# Patient Record
Sex: FEMALE | URBAN - METROPOLITAN AREA
[De-identification: names, ages, dates, MRNs, and addresses within clinical notes are randomized per-mention and may not be internally consistent; named-entity substitution may affect disease eponyms.]

---

## 2019-12-05 ENCOUNTER — APPOINTMENT (OUTPATIENT)
Dept: URBAN - METROPOLITAN AREA CLINIC 237 | Age: 49
Setting detail: DERMATOLOGY
End: 2019-12-05

## 2019-12-05 DIAGNOSIS — L81.4 OTHER MELANIN HYPERPIGMENTATION: ICD-10-CM

## 2019-12-05 PROCEDURE — OTHER TREATMENT REGIMEN: OTHER

## 2019-12-05 PROCEDURE — OTHER PATIENT SPECIFIC COUNSELING: OTHER

## 2019-12-05 PROCEDURE — OTHER COUNSELING: OTHER

## 2019-12-05 PROCEDURE — OTHER SUNSCREEN RECOMMENDATIONS: OTHER

## 2019-12-05 PROCEDURE — 99202 OFFICE O/P NEW SF 15 MIN: CPT

## 2019-12-05 ASSESSMENT — LOCATION SIMPLE DESCRIPTION DERM
LOCATION SIMPLE: LEFT CHEEK
LOCATION SIMPLE: RIGHT CHEEK

## 2019-12-05 ASSESSMENT — LOCATION DETAILED DESCRIPTION DERM
LOCATION DETAILED: RIGHT CENTRAL MALAR CHEEK
LOCATION DETAILED: LEFT CENTRAL MALAR CHEEK

## 2019-12-05 ASSESSMENT — LOCATION ZONE DERM: LOCATION ZONE: FACE

## 2019-12-05 NOTE — PROCEDURE: PATIENT SPECIFIC COUNSELING
Detail Level: Zone
Discussed methods of tx. Recommends beginning w/ glycolic acid products. Would like to avoid bleaching agents like HQ since this can cause bleaching of her natural skin. Unlikely glycolic acid will fade spots entirely. Other options include laser or chemical peels. Would likely refer out for cosmetic treatment depending on response.

## 2019-12-05 NOTE — PROCEDURE: TREATMENT REGIMEN
Detail Level: Zone
Initiate Treatment: SS qd\\nGlycolic acid: Glycolic glow (purchased today) or Glytone Brightening serum qhs
Samples Given: EltaMD spf 46 for hyperpigmentation
Plan: If no improvement, consider compounded Kojic acid/HQ from [facility]

## 2020-03-05 ENCOUNTER — APPOINTMENT (OUTPATIENT)
Dept: URBAN - METROPOLITAN AREA CLINIC 237 | Age: 50
Setting detail: DERMATOLOGY
End: 2020-03-05

## 2020-03-05 DIAGNOSIS — L81.4 OTHER MELANIN HYPERPIGMENTATION: ICD-10-CM

## 2020-03-05 PROCEDURE — OTHER PHOTO-DOCUMENTATION: OTHER

## 2020-03-05 PROCEDURE — OTHER PATIENT SPECIFIC COUNSELING: OTHER

## 2020-03-05 PROCEDURE — 99213 OFFICE O/P EST LOW 20 MIN: CPT

## 2020-03-05 PROCEDURE — OTHER TREATMENT REGIMEN: OTHER

## 2020-03-05 PROCEDURE — OTHER MIPS QUALITY: OTHER

## 2020-03-05 PROCEDURE — OTHER MEDICATION COUNSELING: OTHER

## 2020-03-05 ASSESSMENT — LOCATION SIMPLE DESCRIPTION DERM
LOCATION SIMPLE: RIGHT CHEEK
LOCATION SIMPLE: LEFT CHEEK

## 2020-03-05 ASSESSMENT — LOCATION DETAILED DESCRIPTION DERM
LOCATION DETAILED: LEFT CENTRAL MALAR CHEEK
LOCATION DETAILED: RIGHT CENTRAL MALAR CHEEK

## 2020-03-05 ASSESSMENT — LOCATION ZONE DERM: LOCATION ZONE: FACE

## 2020-03-05 NOTE — PROCEDURE: PATIENT SPECIFIC COUNSELING
Detail Level: Zone
Recommends escalating therapy to [facility] compound. Continue to use sunscreen daily. If pt isn’t getting the results she would like with topicals, consider IPL or chemical peels, although cautious use given skin type. Will give pt the names of a few different dermatologists to look into (Dr. Bae, Dr. Martin).\\n\\nAfter visit note: may need to up-titrate dose of HQ - some reports up to 8 or 12% with good effect.

## 2020-03-05 NOTE — PROCEDURE: MEDICATION COUNSELING
Xelwaiz Pregnancy And Lactation Text: This medication is Pregnancy Category D and is not considered safe during pregnancy.  The risk during breast feeding is also uncertain.

## 2020-03-05 NOTE — PROCEDURE: TREATMENT REGIMEN
Initiate Treatment: [facility] 21D: Kojic acid 5%, Desonide 0.05%, HQ 2% apply qhs
Continue Regimen: EltaMD sunscreen qam
Discontinue Regimen: Glycolic acid
Detail Level: Zone

## 2020-03-05 NOTE — PROCEDURE: PHOTO-DOCUMENTATION
Photo Preface (Leave Blank If You Do Not Want): Photographs were obtained today
Details (Free Text): 2
Detail Level: Zone

## 2020-03-05 NOTE — PROCEDURE: MEDICATION COUNSELING
No Rhofade Counseling: Rhofade is a topical medication which can decrease superficial blood flow where applied. Side effects are uncommon and include stinging, redness and allergic reactions.

## 2023-09-28 NOTE — PROCEDURE: MEDICATION COUNSELING
Addended by: SWATI DENSON on: 9/28/2023 08:40 AM     Modules accepted: Orders     Azithromycin Counseling:  I discussed with the patient the risks of azithromycin including but not limited to GI upset, allergic reaction, drug rash, diarrhea, and yeast infections.
